# Patient Record
Sex: MALE | Race: WHITE | Employment: OTHER | URBAN - METROPOLITAN AREA
[De-identification: names, ages, dates, MRNs, and addresses within clinical notes are randomized per-mention and may not be internally consistent; named-entity substitution may affect disease eponyms.]

---

## 2023-07-07 ENCOUNTER — OFFICE VISIT (OUTPATIENT)
Dept: PRIMARY CARE | Facility: CLINIC | Age: 28
End: 2023-07-07
Payer: MEDICARE

## 2023-07-07 ENCOUNTER — LAB (OUTPATIENT)
Dept: LAB | Facility: LAB | Age: 28
End: 2023-07-07
Payer: COMMERCIAL

## 2023-07-07 VITALS
OXYGEN SATURATION: 98 % | SYSTOLIC BLOOD PRESSURE: 135 MMHG | HEIGHT: 75 IN | HEART RATE: 66 BPM | RESPIRATION RATE: 18 BRPM | BODY MASS INDEX: 22.01 KG/M2 | DIASTOLIC BLOOD PRESSURE: 90 MMHG | TEMPERATURE: 97.5 F | WEIGHT: 177 LBS

## 2023-07-07 DIAGNOSIS — Z13.220 LIPID SCREENING: ICD-10-CM

## 2023-07-07 DIAGNOSIS — K59.09 CHRONIC CONSTIPATION: ICD-10-CM

## 2023-07-07 DIAGNOSIS — E55.9 VITAMIN D DEFICIENCY: ICD-10-CM

## 2023-07-07 DIAGNOSIS — E53.8 VITAMIN B12 DEFICIENCY: ICD-10-CM

## 2023-07-07 DIAGNOSIS — Z00.00 ENCOUNTER FOR HEALTH MAINTENANCE EXAMINATION: Primary | ICD-10-CM

## 2023-07-07 DIAGNOSIS — Z00.00 ENCOUNTER FOR HEALTH MAINTENANCE EXAMINATION: ICD-10-CM

## 2023-07-07 LAB
ALANINE AMINOTRANSFERASE (SGPT) (U/L) IN SER/PLAS: 15 U/L (ref 10–52)
ALBUMIN (G/DL) IN SER/PLAS: 4.7 G/DL (ref 3.4–5)
ALKALINE PHOSPHATASE (U/L) IN SER/PLAS: 41 U/L (ref 33–120)
ANION GAP IN SER/PLAS: 9 MMOL/L (ref 10–20)
ASPARTATE AMINOTRANSFERASE (SGOT) (U/L) IN SER/PLAS: 20 U/L (ref 9–39)
BILIRUBIN TOTAL (MG/DL) IN SER/PLAS: 0.9 MG/DL (ref 0–1.2)
CALCIDIOL (25 OH VITAMIN D3) (NG/ML) IN SER/PLAS: 39 NG/ML
CALCIUM (MG/DL) IN SER/PLAS: 9.7 MG/DL (ref 8.6–10.3)
CARBON DIOXIDE, TOTAL (MMOL/L) IN SER/PLAS: 29 MMOL/L (ref 21–32)
CHLORIDE (MMOL/L) IN SER/PLAS: 104 MMOL/L (ref 98–107)
CHOLESTEROL (MG/DL) IN SER/PLAS: 142 MG/DL (ref 0–199)
CHOLESTEROL IN HDL (MG/DL) IN SER/PLAS: 57 MG/DL
CHOLESTEROL/HDL RATIO: 2.5
COBALAMIN (VITAMIN B12) (PG/ML) IN SER/PLAS: 349 PG/ML (ref 211–911)
CREATININE (MG/DL) IN SER/PLAS: 1.09 MG/DL (ref 0.5–1.3)
ERYTHROCYTE DISTRIBUTION WIDTH (RATIO) BY AUTOMATED COUNT: 13 % (ref 11.5–14.5)
ERYTHROCYTE MEAN CORPUSCULAR HEMOGLOBIN CONCENTRATION (G/DL) BY AUTOMATED: 33.1 G/DL (ref 32–36)
ERYTHROCYTE MEAN CORPUSCULAR VOLUME (FL) BY AUTOMATED COUNT: 91 FL (ref 80–100)
ERYTHROCYTES (10*6/UL) IN BLOOD BY AUTOMATED COUNT: 4.55 X10E12/L (ref 4.5–5.9)
GFR MALE: >90 ML/MIN/1.73M2
GLUCOSE (MG/DL) IN SER/PLAS: 79 MG/DL (ref 74–99)
HEMATOCRIT (%) IN BLOOD BY AUTOMATED COUNT: 41.4 % (ref 41–52)
HEMOGLOBIN (G/DL) IN BLOOD: 13.7 G/DL (ref 13.5–17.5)
LDL: 76 MG/DL (ref 0–99)
LEUKOCYTES (10*3/UL) IN BLOOD BY AUTOMATED COUNT: 4.2 X10E9/L (ref 4.4–11.3)
PLATELETS (10*3/UL) IN BLOOD AUTOMATED COUNT: 247 X10E9/L (ref 150–450)
POTASSIUM (MMOL/L) IN SER/PLAS: 4.2 MMOL/L (ref 3.5–5.3)
PROTEIN TOTAL: 7 G/DL (ref 6.4–8.2)
SODIUM (MMOL/L) IN SER/PLAS: 138 MMOL/L (ref 136–145)
THYROTROPIN (MIU/L) IN SER/PLAS BY DETECTION LIMIT <= 0.05 MIU/L: 2.57 MIU/L (ref 0.44–3.98)
TRIGLYCERIDE (MG/DL) IN SER/PLAS: 44 MG/DL (ref 0–149)
UREA NITROGEN (MG/DL) IN SER/PLAS: 16 MG/DL (ref 6–23)
VLDL: 9 MG/DL (ref 0–40)

## 2023-07-07 PROCEDURE — 80053 COMPREHEN METABOLIC PANEL: CPT

## 2023-07-07 PROCEDURE — 36415 COLL VENOUS BLD VENIPUNCTURE: CPT

## 2023-07-07 PROCEDURE — 82306 VITAMIN D 25 HYDROXY: CPT

## 2023-07-07 PROCEDURE — 85027 COMPLETE CBC AUTOMATED: CPT

## 2023-07-07 PROCEDURE — 84443 ASSAY THYROID STIM HORMONE: CPT

## 2023-07-07 PROCEDURE — 82607 VITAMIN B-12: CPT

## 2023-07-07 PROCEDURE — 80061 LIPID PANEL: CPT

## 2023-07-07 PROCEDURE — 99395 PREV VISIT EST AGE 18-39: CPT | Performed by: FAMILY MEDICINE

## 2023-07-07 RX ORDER — TADALAFIL 5 MG/1
5 TABLET ORAL DAILY
COMMUNITY
Start: 2023-04-08 | End: 2024-03-05

## 2023-07-07 NOTE — PROGRESS NOTES
"Subjective   Patient ID: Juan J Torres is a 28 y.o. male who presents for Annual Exam (Digestive health, feels like things have not been normal approx the last 6mos- stool wise. . ) and Lab Orders (fasting).    HPI   Patient comes in today for physical exam.  He would like to discuss his digestive health.  He is in a new house.  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  He states he was in his usual state of health until December 2022 when he started having problems with his bowels.  He now is having problems with constipation and his bowel movements are large and loose when they do come.  Sometimes he feels like he \"does not get it all out\".  He denies stomachaches.  He has been trying probiotics and cleaning up his diet and even tried fasting but nothing seems to help.  He is otherwise without complaints.  He is in good physical shape and works out.        10/1/2019  Patient comes in today complaining of severe headache behind his left eye on Sunday night after the [employer] game. He states he was fine all day long and after the game he went to work out. While he was lifting some weights he began to see spots in his left thigh and developed a severe pain just behind the left eye and the left temporal region. He stopped working out and did take some Advil and it slowly went away. Ever since then he has been extremely tired. He has not had a recurrence of the headache. He did have a hair transplant on 4/29/19 and the surgical line can be seen up in the frontal scalp near where the headache has been.    2/19/2019   The patient is a 23 year old male who presents today for an annual exam. His diet includes balanced healthy meals, dairy products, vegetables, meats, water and coffee, but NOT fast food, soda / pop, energy drinks or vitamins. He exercises competitively and 5 - 6 days a week. The patient is not sexually active. PHQ 2 DEPRESSION / GENERAL SCREENING: He does not report: often feeling down or depressed, " "having little interest in things, conflict or violence in the family, a mole that changed in size/shape, poor sleep, low energy or specific complaints. Note for \"Well Exam - Male Adult\": Patient comes in today complaining that he's had itching since last May.  He states the itching is all over his body and never produces a rash that is visible but he continues to itch.  He states he has been through a couple of dermatologists who recommended medications are cost over \"$800\" and thus he didn't get them filled.  The rash is on the back of his scalp as well.  Claritin does help.    Patient is also here today for a physical exam.      Review of Systems   All other systems reviewed and are negative.      Objective   /90   Pulse 66   Temp 36.4 °C (97.5 °F)   Resp 18   Ht 1.905 m (6' 3\")   Wt 80.3 kg (177 lb)   SpO2 98%   BMI 22.12 kg/m²     Physical Exam  Vitals reviewed.   Constitutional:       Appearance: He is well-developed.   HENT:      Head: Normocephalic and atraumatic.      Right Ear: Tympanic membrane, ear canal and external ear normal.      Left Ear: Tympanic membrane, ear canal and external ear normal.      Nose: Nose normal.      Mouth/Throat:      Mouth: Mucous membranes are moist.      Pharynx: Oropharynx is clear.   Eyes:      Extraocular Movements: Extraocular movements intact.      Conjunctiva/sclera: Conjunctivae normal.      Pupils: Pupils are equal, round, and reactive to light.   Cardiovascular:      Rate and Rhythm: Normal rate and regular rhythm.      Heart sounds: Normal heart sounds. No murmur heard.  Pulmonary:      Effort: Pulmonary effort is normal.      Breath sounds: Normal breath sounds. No wheezing.   Abdominal:      General: Abdomen is flat. Bowel sounds are normal.      Palpations: Abdomen is soft.   Musculoskeletal:         General: Normal range of motion.   Skin:     General: Skin is warm and dry.   Neurological:      General: No focal deficit present.      Mental Status: " He is alert and oriented to person, place, and time. Mental status is at baseline.   Psychiatric:         Mood and Affect: Mood normal.         Behavior: Behavior normal.         Thought Content: Thought content normal.         Judgment: Judgment normal.         Assessment/Plan   Problem List Items Addressed This Visit       Encounter for health maintenance examination - Primary    Relevant Orders    Comprehensive Metabolic Panel (Completed)    Vitamin D deficiency    Relevant Orders    Vitamin D, Total (Completed)    Vitamin B12 deficiency    Relevant Orders    CBC (Completed)    Vitamin B12 (Completed)    Chronic constipation    Relevant Orders    TSH with reflex to Free T4 if abnormal (Completed)   Will contact patient with lab results when available.  Patient to try Metamucil daily  Medication list reconciled.  Thank you for visiting today!      Professional services: Some of this note was completed using Dragon voice technology and sometimes the software misinterprets words. This may include unintended errors with respect to translation of words, typographical errors or grammar errors which may not have been identified prior to finalization of the chart note. Please take this into account when reading the note. Thank you.

## 2023-11-01 ENCOUNTER — ANCILLARY PROCEDURE (OUTPATIENT)
Dept: RADIOLOGY | Facility: CLINIC | Age: 28
End: 2023-11-01
Payer: COMMERCIAL

## 2023-11-01 ENCOUNTER — OFFICE VISIT (OUTPATIENT)
Dept: GASTROENTEROLOGY | Facility: CLINIC | Age: 28
End: 2023-11-01
Payer: COMMERCIAL

## 2023-11-01 ENCOUNTER — LAB (OUTPATIENT)
Dept: LAB | Facility: LAB | Age: 28
End: 2023-11-01
Payer: COMMERCIAL

## 2023-11-01 VITALS
WEIGHT: 180.6 LBS | DIASTOLIC BLOOD PRESSURE: 87 MMHG | SYSTOLIC BLOOD PRESSURE: 133 MMHG | HEART RATE: 76 BPM | HEIGHT: 75 IN | BODY MASS INDEX: 22.46 KG/M2

## 2023-11-01 DIAGNOSIS — R19.4 CHANGE IN BOWEL HABITS: Primary | ICD-10-CM

## 2023-11-01 DIAGNOSIS — R19.4 CHANGE IN BOWEL HABITS: ICD-10-CM

## 2023-11-01 DIAGNOSIS — R19.7 DIARRHEA, UNSPECIFIED TYPE: ICD-10-CM

## 2023-11-01 DIAGNOSIS — R14.0 ABDOMINAL BLOATING: ICD-10-CM

## 2023-11-01 LAB
ALBUMIN SERPL BCP-MCNC: 4.9 G/DL (ref 3.4–5)
ALP SERPL-CCNC: 46 U/L (ref 33–120)
ALT SERPL W P-5'-P-CCNC: 17 U/L (ref 10–52)
ANION GAP SERPL CALC-SCNC: 12 MMOL/L (ref 10–20)
AST SERPL W P-5'-P-CCNC: 19 U/L (ref 9–39)
BASOPHILS # BLD AUTO: 0.05 X10*3/UL (ref 0–0.1)
BASOPHILS NFR BLD AUTO: 0.7 %
BILIRUB SERPL-MCNC: 0.6 MG/DL (ref 0–1.2)
BUN SERPL-MCNC: 21 MG/DL (ref 6–23)
CALCIUM SERPL-MCNC: 9.5 MG/DL (ref 8.6–10.3)
CHLORIDE SERPL-SCNC: 102 MMOL/L (ref 98–107)
CO2 SERPL-SCNC: 29 MMOL/L (ref 21–32)
CREAT SERPL-MCNC: 1.06 MG/DL (ref 0.5–1.3)
CRP SERPL-MCNC: <0.1 MG/DL
EOSINOPHIL # BLD AUTO: 0.2 X10*3/UL (ref 0–0.7)
EOSINOPHIL NFR BLD AUTO: 2.9 %
ERYTHROCYTE [DISTWIDTH] IN BLOOD BY AUTOMATED COUNT: 12.8 % (ref 11.5–14.5)
ERYTHROCYTE [SEDIMENTATION RATE] IN BLOOD BY WESTERGREN METHOD: <1 MM/H (ref 0–15)
FOLATE SERPL-MCNC: 7.3 NG/ML
GFR SERPL CREATININE-BSD FRML MDRD: >90 ML/MIN/1.73M*2
GLUCOSE SERPL-MCNC: 77 MG/DL (ref 74–99)
HCT VFR BLD AUTO: 44.7 % (ref 41–52)
HGB BLD-MCNC: 14.9 G/DL (ref 13.5–17.5)
IMM GRANULOCYTES # BLD AUTO: 0.02 X10*3/UL (ref 0–0.7)
IMM GRANULOCYTES NFR BLD AUTO: 0.3 % (ref 0–0.9)
LYMPHOCYTES # BLD AUTO: 2.47 X10*3/UL (ref 1.2–4.8)
LYMPHOCYTES NFR BLD AUTO: 36.1 %
MCH RBC QN AUTO: 30.5 PG (ref 26–34)
MCHC RBC AUTO-ENTMCNC: 33.3 G/DL (ref 32–36)
MCV RBC AUTO: 92 FL (ref 80–100)
MONOCYTES # BLD AUTO: 0.55 X10*3/UL (ref 0.1–1)
MONOCYTES NFR BLD AUTO: 8 %
NEUTROPHILS # BLD AUTO: 3.56 X10*3/UL (ref 1.2–7.7)
NEUTROPHILS NFR BLD AUTO: 52 %
NRBC BLD-RTO: 0 /100 WBCS (ref 0–0)
PLATELET # BLD AUTO: 253 X10*3/UL (ref 150–450)
POTASSIUM SERPL-SCNC: 4.1 MMOL/L (ref 3.5–5.3)
PROT SERPL-MCNC: 7.4 G/DL (ref 6.4–8.2)
RBC # BLD AUTO: 4.88 X10*6/UL (ref 4.5–5.9)
SODIUM SERPL-SCNC: 139 MMOL/L (ref 136–145)
TSH SERPL-ACNC: 2.43 MIU/L (ref 0.44–3.98)
WBC # BLD AUTO: 6.9 X10*3/UL (ref 4.4–11.3)

## 2023-11-01 PROCEDURE — 82746 ASSAY OF FOLIC ACID SERUM: CPT

## 2023-11-01 PROCEDURE — 74018 RADEX ABDOMEN 1 VIEW: CPT | Mod: FY

## 2023-11-01 PROCEDURE — 36415 COLL VENOUS BLD VENIPUNCTURE: CPT

## 2023-11-01 PROCEDURE — 99203 OFFICE O/P NEW LOW 30 MIN: CPT | Performed by: REGISTERED NURSE

## 2023-11-01 PROCEDURE — 86140 C-REACTIVE PROTEIN: CPT

## 2023-11-01 PROCEDURE — 85652 RBC SED RATE AUTOMATED: CPT

## 2023-11-01 PROCEDURE — 74018 RADEX ABDOMEN 1 VIEW: CPT | Performed by: RADIOLOGY

## 2023-11-01 PROCEDURE — 85025 COMPLETE CBC W/AUTO DIFF WBC: CPT

## 2023-11-01 PROCEDURE — 84443 ASSAY THYROID STIM HORMONE: CPT

## 2023-11-01 PROCEDURE — 80053 COMPREHEN METABOLIC PANEL: CPT

## 2023-11-01 PROCEDURE — 1036F TOBACCO NON-USER: CPT | Performed by: REGISTERED NURSE

## 2023-11-01 PROCEDURE — 83516 IMMUNOASSAY NONANTIBODY: CPT

## 2023-11-01 RX ORDER — SODIUM, POTASSIUM,MAG SULFATES 17.5-3.13G
1 SOLUTION, RECONSTITUTED, ORAL ORAL EVERY 12 HOURS
Qty: 2 EACH | Refills: 0 | Status: SHIPPED | OUTPATIENT
Start: 2023-11-01

## 2023-11-01 ASSESSMENT — ENCOUNTER SYMPTOMS
SHORTNESS OF BREATH: 0
EYES NEGATIVE: 1
MYALGIAS: 0
ARTHRALGIAS: 0
UNEXPECTED WEIGHT CHANGE: 0
JOINT SWELLING: 0
NERVOUS/ANXIOUS: 0
ENDOCRINE NEGATIVE: 1
DIFFICULTY URINATING: 0
APPETITE CHANGE: 0
COUGH: 0
EYE PAIN: 0

## 2023-11-01 NOTE — PATIENT INSTRUCTIONS
ASSESSMENT    Change in bowels     PLAN    Check labs and stool tests   Do abdominal x-ray   Schedule Colonoscopy.  My office will send in a prescription for a bowel prep for you to your pharmacy.  Please follow the prep instructions closely.  You may have clear liquids only the day before the procedure. You cannot have anything red or purple. You may have popsicles, hard candy or gum.  You will need a .  You will receive sedation for the procedure by an an anesthesia professional to keep you comfortable during the procedure.     Constipation  - Begin laxative - Miralax (Polyethylene Glycol) 17g (one capful) mixed in 6-8 oz of water and drink each evening after dinner. This may take 2-4 days to work.  If you are not moving your bowels well after 4 days, then increase the dose to 17g mixed in 6-8 oz of water, twice a day, 12 hours apart. Lower the dose as needed.   Follow up after the procedure

## 2023-11-01 NOTE — PROGRESS NOTES
REASON FOR VISIT:  Patient states he is having alternating constipation and diarrhea. No GERD symptoms.  Concerned that he may have IBD or IBS.    HPI:  Juan J Torres is a 28 y.o. male who presents for change in bowels x one year. Alternating constipation and diarrhea since 12/22.  Endorses blood on the toilet tissue.  Denies any change in medications or diet.  He endorses increased stress at work for the past year.  States that he has a history of hypothyroidism but has not taken medications.  He is trying to treat his thyroid issue with a holistic approach.    Normal pattern - 1-2x a day.  States that his stool is usually thin pencils.  Sometimes strains to have a bowel movement or skips a day and then has watery diarrhea..  His weight and appetite are stable.      He denies reflux, dysphagia, odynophagia, epigastric pain, nausea or vomiting.  He endorses lower abdominal pain and bloating.    Denies NSAID use    Med list notable for -  No medications     Labs notable for - normal labs in July     No fhx of CRC- no fam h/o colon CA in 1st degree relative, no family history of IBD or celiac disease    Prev endoscopic eval:     >> Colonoscopy - never  >> EGD - never     REVIEW OF SYSTEMS    Review of Systems   Constitutional:  Negative for appetite change and unexpected weight change.   HENT:  Negative for mouth sores.    Eyes: Negative.  Negative for pain and visual disturbance.   Respiratory:  Negative for cough and shortness of breath.    Cardiovascular:  Negative for chest pain.   Endocrine: Negative.    Genitourinary:  Negative for difficulty urinating.   Musculoskeletal:  Negative for arthralgias, joint swelling and myalgias.   Skin:  Negative for rash.   Allergic/Immunologic: Negative for environmental allergies and food allergies.   Psychiatric/Behavioral:  The patient is not nervous/anxious.       No Known Allergies    No past medical history on file. - thyroid disease ?     No past surgical history on  file. - denies abd surgery     No family history on file.    Social History     Tobacco Use    Smoking status: Never     Passive exposure: Never    Smokeless tobacco: Never   Substance Use Topics    Alcohol use: Yes       Current Outpatient Medications   Medication Sig Dispense Refill    tadalafil (Cialis) 5 mg tablet Take 1 tablet (5 mg) by mouth once daily.       No current facility-administered medications for this visit.       PHYSICAL EXAM:  There were no vitals taken for this visit.     Physical Exam  Constitutional:       Appearance: Normal appearance.   HENT:      Head: Normocephalic and atraumatic.      Nose: Nose normal.   Eyes:      Pupils: Pupils are equal, round, and reactive to light.   Cardiovascular:      Rate and Rhythm: Normal rate and regular rhythm.   Pulmonary:      Effort: Pulmonary effort is normal.      Breath sounds: Normal breath sounds.   Abdominal:      General: Abdomen is flat. Bowel sounds are normal. There is no distension.      Palpations: Abdomen is soft. There is no mass.      Tenderness: There is no abdominal tenderness. There is no guarding or rebound.      Hernia: No hernia is present.   Genitourinary:     Rectum: Normal.   Musculoskeletal:         General: Normal range of motion.      Cervical back: Normal range of motion and neck supple.   Skin:     General: Skin is warm and dry.   Neurological:      Mental Status: He is alert and oriented to person, place, and time.   Psychiatric:         Mood and Affect: Mood normal.         Behavior: Behavior normal.          ASSESSMENT    Change in bowels for one year   Suspect IBS but will proceed with colonoscopy to rule out inflammatory bowel disease    PLAN    Check labs and stool tests to rule out infection, malabsorption or inflammation  Do abdominal x-ray today  Schedule Colonoscopy.  My office will send in a prescription for a bowel prep for you to your pharmacy.  Please follow the prep instructions closely.  You may have clear  liquids only the day before the procedure. You cannot have anything red or purple. You may have popsicles, hard candy or gum.  You will need a .  You will receive sedation for the procedure by an an anesthesia professional to keep you comfortable during the procedure.     Constipation  - Begin laxative - Miralax (Polyethylene Glycol) 17g (one capful) mixed in 6-8 oz of water and drink each evening after dinner. This may take 2-4 days to work.  If you are not moving your bowels well after 4 days, then increase the dose to 17g mixed in 6-8 oz of water, twice a day, 12 hours apart. Lower the dose as needed.   You can also continue Metamucil daily I would recommend that you use 1 teaspoon of Metamucil twice a day mixed in 6 to 8 ounces of water to see if that may improve your bowel habits  Follow up after the procedure .  Can do a virtual telemedicine visit if you prefer              Date: 11/1/2023  Time: 2:44 PM

## 2023-11-02 LAB — TTG IGA SER IA-ACNC: <1 U/ML

## 2023-11-28 ENCOUNTER — APPOINTMENT (OUTPATIENT)
Dept: PRIMARY CARE | Facility: CLINIC | Age: 28
End: 2023-11-28

## 2023-12-04 ENCOUNTER — APPOINTMENT (OUTPATIENT)
Dept: PRIMARY CARE | Facility: CLINIC | Age: 28
End: 2023-12-04

## 2023-12-05 ENCOUNTER — LAB (OUTPATIENT)
Dept: LAB | Facility: LAB | Age: 28
End: 2023-12-05
Payer: COMMERCIAL

## 2023-12-05 DIAGNOSIS — R19.7 DIARRHEA, UNSPECIFIED TYPE: ICD-10-CM

## 2023-12-05 PROCEDURE — 83993 ASSAY FOR CALPROTECTIN FECAL: CPT

## 2023-12-05 PROCEDURE — 87506 IADNA-DNA/RNA PROBE TQ 6-11: CPT

## 2023-12-05 PROCEDURE — 89125 SPECIMEN FAT STAIN: CPT

## 2023-12-05 PROCEDURE — 82653 EL-1 FECAL QUANTITATIVE: CPT

## 2023-12-06 LAB

## 2023-12-07 ENCOUNTER — HOSPITAL ENCOUNTER (OUTPATIENT)
Dept: GASTROENTEROLOGY | Facility: EXTERNAL LOCATION | Age: 28
Discharge: HOME | End: 2023-12-07
Payer: COMMERCIAL

## 2023-12-07 VITALS
SYSTOLIC BLOOD PRESSURE: 131 MMHG | HEART RATE: 74 BPM | HEIGHT: 75 IN | WEIGHT: 175 LBS | RESPIRATION RATE: 16 BRPM | OXYGEN SATURATION: 98 % | DIASTOLIC BLOOD PRESSURE: 82 MMHG | BODY MASS INDEX: 21.76 KG/M2 | TEMPERATURE: 97.5 F

## 2023-12-07 DIAGNOSIS — R14.0 ABDOMINAL BLOATING: ICD-10-CM

## 2023-12-07 DIAGNOSIS — R19.4 CHANGE IN BOWEL HABITS: ICD-10-CM

## 2023-12-07 PROCEDURE — 88305 TISSUE EXAM BY PATHOLOGIST: CPT | Performed by: PATHOLOGY

## 2023-12-07 PROCEDURE — 45380 COLONOSCOPY AND BIOPSY: CPT | Performed by: STUDENT IN AN ORGANIZED HEALTH CARE EDUCATION/TRAINING PROGRAM

## 2023-12-07 PROCEDURE — 88305 TISSUE EXAM BY PATHOLOGIST: CPT

## 2023-12-07 PROCEDURE — 0753T DGTZ GLS MCRSCP SLD LEVEL IV: CPT

## 2023-12-07 RX ORDER — SODIUM CHLORIDE 9 MG/ML
20 INJECTION, SOLUTION INTRAVENOUS CONTINUOUS
Status: DISCONTINUED | OUTPATIENT
Start: 2023-12-07 | End: 2023-12-08 | Stop reason: HOSPADM

## 2023-12-07 ASSESSMENT — PAIN SCALES - GENERAL
PAINLEVEL_OUTOF10: 0 - NO PAIN

## 2023-12-07 ASSESSMENT — PAIN - FUNCTIONAL ASSESSMENT
PAIN_FUNCTIONAL_ASSESSMENT: 0-10

## 2023-12-07 ASSESSMENT — COLUMBIA-SUICIDE SEVERITY RATING SCALE - C-SSRS
1. IN THE PAST MONTH, HAVE YOU WISHED YOU WERE DEAD OR WISHED YOU COULD GO TO SLEEP AND NOT WAKE UP?: NO
6. HAVE YOU EVER DONE ANYTHING, STARTED TO DO ANYTHING, OR PREPARED TO DO ANYTHING TO END YOUR LIFE?: NO
2. HAVE YOU ACTUALLY HAD ANY THOUGHTS OF KILLING YOURSELF?: NO

## 2023-12-07 NOTE — SIGNIFICANT EVENT
Discharge Instructions reviewed with patient. Verbalizes an understanding of post procedure and anesthesia instructions. Verbalizes an understanding of signs/symptoms to watch for at home.      No difficulties

## 2023-12-07 NOTE — DISCHARGE INSTRUCTIONS
Patient Instructions Post Procedure      The anesthetics, sedatives or narcotics which were given to you today will be acting in your body for the next 24 hours, so you might feel a little sleepy or groggy.  This feeling should slowly wear off. Carefully read and follow the instructions.     You received sedation today:  - Do not drive or operate any machinery or power tools of any kind.   - No alcoholic beverages today, not even beer or wine.  - Do not make any important decisions or sign any legal documents.  - No over the counter medications that contain alcohol or that may cause drowsiness.    While it is common to experience mild to moderate abdominal distention, gas, or belching after your procedure, if any of these symptoms occur following discharge from the GI Lab or within one week of having your procedure, call the [facility] to be advised whether a visit to your nearest Urgent Care or Emergency Department is indicated.  Take this paper with you if you go.   - If you develop an allergic reaction to the medications that were given during your procedure such as difficulty breathing, rash, hives, severe nausea, vomiting or lightheadedness.  - If you experience chest pain, shortness of breath, severe abdominal pain, fevers and chills.  -If you develop signs and symptoms of bleeding such as blood in your spit, if your stools turn black, tarry, or bloody  - If you have not urinated within 8 hours following your procedure.  - If your IV site becomes painful, red, inflamed, or looks infected.    If you received a biopsy/polypectomy/sphincterotomy the following instructions apply below:  __ Do not use Aspirin containing products, non-steroidal medications or anti-coagulants for one week following your procedure. (Examples of these types of medications are: Advil, Arthrotec, Aleve, Coumadin, Ecotrin, Heparin, Ibuprofen, Indocin, Motrin, Naprosyn, Nuprin, Plavix, Vioxx, and Voltarin, or their generic  forms.  This list is not all-inclusive.  Check with your physician or pharmacist before resuming medications.)   __ Eat a soft diet today.  Avoid foods that are poorly digested for the next 24 hours.  These foods would include: nuts, beans, lettuce, red meats, and fried foods. Start with liquids and advance your diet as tolerated, gradually work up to eating solids.   __ Do not have a Barium Study or Enema for one week.    Your physician recommends the additional following instructions:    -You have a contact number available for emergencies. The signs and symptoms of potential delayed complications were discussed with you. You may return to normal activities tomorrow.  -Resume your previous diet or other if specified.  -Continue your present medications.   -We are waiting for your pathology results, if applicable.  -The findings and recommendations have been discussed with you and/or family.  - Please see Medication Reconciliation Form for new medication/medications prescribed.     If you experience any problems or have any questions following discharge from the GI Lab, please call: [phone] from 7 am- 4:30 pm.  In the event of an emergency please go to the closest Emergency Department or call Dr. Wayne [phone]

## 2023-12-07 NOTE — H&P
Outpatient Hospital Procedure H&P    Patient Profile  Name Juan J Torres  Date of Birth 1995  MRN [id number]  PCP Adarsh Zee        Diagnosis: Altered bowel habits.   Procedure(s):  Colonoscopy.    Allergies  No Known Allergies    Past Medical History   Past Medical History:   Diagnosis Date    Change in bowel habit        Medication Reviewed - yes  Prior to Admission medications    Medication Sig Start Date End Date Taking? Authorizing Provider   sodium,potassium,mag sulfates (Suprep Bowel Prep Kit) 17.5-3.13-1.6 gram recon soln solution Take 1 bottle by mouth every 12 hours. 11/1/23  Yes Colleen Mittal, APRN-CNP   tadalafil (Cialis) 5 mg tablet Take 1 tablet (5 mg) by mouth once daily. 4/8/23   Historical Provider, MD       Physical Exam  Vitals:    12/07/23 1209   Pulse: 77   Resp: 10   Temp: 36.3 °C (97.3 °F)   SpO2: 100%      Weight   Vitals:    12/07/23 1209   Weight: 79.4 kg (175 lb)     BMI Body mass index is 21.87 kg/m².    General: A&Ox3, NAD.  HEENT: AT/NC.   CV: RRR. No murmur.  Resp: CTA bilaterally. No wheezing, rhonchi or rales.   GI: Soft, NT/ND. BSx4.  Extrem: No edema. Pulses intact.  Skin: No Jaundice.   Neuro: No focal deficits.   Psych: Normal mood and affect.        Oropharyngeal Classification I (soft palate, uvula, fauces, and tonsillar pillars visible)  ASA PS Classification 1.  Sedation Plan: Deep Sedation.  Procedure Plan - pre-procedural (re)assesment completed by physician:  discharge/transfer patient when discharge criteria met    Richard Wayne DO  12/7/2023 12:20 PM

## 2023-12-07 NOTE — Clinical Note
Patient tolerated the procedure well and is comfortable with no complaints of pain. Abdomen soft non distended. Vital signs stable. Arousable prior to transport. Patient transported to PACU via stretcher. Handoff completed.

## 2023-12-08 LAB
FAT STL QL: NORMAL
NEUTRAL FAT STL QL: NORMAL

## 2023-12-09 LAB
CALPROTECTIN STL-MCNT: 16 UG/G
ELASTASE PANC STL-MCNT: 715 UG/G

## 2023-12-11 ENCOUNTER — TELEPHONE (OUTPATIENT)
Dept: GASTROENTEROLOGY | Facility: CLINIC | Age: 28
End: 2023-12-11

## 2024-01-04 LAB
LABORATORY COMMENT REPORT: NORMAL
PATH REPORT.FINAL DX SPEC: NORMAL
PATH REPORT.GROSS SPEC: NORMAL
PATH REPORT.TOTAL CANCER: NORMAL

## 2024-01-08 ENCOUNTER — TELEPHONE (OUTPATIENT)
Dept: GASTROENTEROLOGY | Facility: CLINIC | Age: 29
End: 2024-01-08

## 2024-03-05 ENCOUNTER — OFFICE VISIT (OUTPATIENT)
Dept: UROLOGY | Facility: CLINIC | Age: 29
End: 2024-03-05
Payer: COMMERCIAL

## 2024-03-05 VITALS — TEMPERATURE: 97 F | BODY MASS INDEX: 21.76 KG/M2 | HEIGHT: 75 IN | WEIGHT: 175 LBS

## 2024-03-05 DIAGNOSIS — N52.8 OTHER MALE ERECTILE DYSFUNCTION: ICD-10-CM

## 2024-03-05 DIAGNOSIS — N52.9 ERECTILE DYSFUNCTION, UNSPECIFIED ERECTILE DYSFUNCTION TYPE: ICD-10-CM

## 2024-03-05 DIAGNOSIS — I86.1 VARICOCELE: ICD-10-CM

## 2024-03-05 DIAGNOSIS — Z09 ENCOUNTER FOR FOLLOW-UP: Primary | ICD-10-CM

## 2024-03-05 DIAGNOSIS — R39.9 LOWER URINARY TRACT SYMPTOMS (LUTS): ICD-10-CM

## 2024-03-05 PROCEDURE — 99213 OFFICE O/P EST LOW 20 MIN: CPT | Performed by: UROLOGY

## 2024-03-05 PROCEDURE — 1036F TOBACCO NON-USER: CPT | Performed by: UROLOGY

## 2024-03-05 RX ORDER — TADALAFIL 5 MG/1
5 TABLET ORAL DAILY
Qty: 30 TABLET | Refills: 11 | Status: SHIPPED | OUTPATIENT
Start: 2024-03-05 | End: 2024-04-04

## 2024-03-05 ASSESSMENT — PAIN SCALES - GENERAL: PAINLEVEL: 0-NO PAIN

## 2024-03-05 NOTE — PROGRESS NOTES
"HPI  28 y.o. male presenting for Varicocele, ED.      Last visit 2/7/23:  -Discussed conservative measures like nsaids, elevation, ice, tight underwear etc.  -Discussed warning signs  -Reviewed varicocele repair in detail  -ED labs (patient may complete this elsewhere and will bring results)  -trial of Cialis 5 mg daily.  -referral to sex psychologist     Todays Visit:  #Erectile dysfunction  -daily Cialis 5mg  -uses very rarely, does help  -erections have overall improved     -ED more with intercourse than masturbation  -believes there are psych factors  -libido is weaker and variable  -non smoker  -no ntg, no hx of heart attack     #Varicocele  -continued pain on both sides, more so on left  -has become very bothersome for him  -has noticed that testicles are \"twisted\" when he wakes up  -did not do pelvic floor PT  -pain worsens with activity  -denies f/c, n/v, unexpected weight loss   -some tenderness to palpation     -Dr. Johnston at [facility] diagnosed- , wanted to do bilateral varicocele repair   -dull scrotal pain when moving   -had a UTI in past, never STIs  -pain intermittent  -no new masses or lumps     Scrotal US at [facility]: bilateral varicoceles  Scrotal US 1/21/21: bilateral varicoceles      [facility]  [employer] school to be special     Current Medications:  Current Outpatient Medications   Medication Sig Dispense Refill    sodium,potassium,mag sulfates (Suprep Bowel Prep Kit) 17.5-3.13-1.6 gram recon soln solution Take 1 bottle by mouth every 12 hours. 2 each 0    tadalafil (Cialis) 5 mg tablet Take 1 tablet (5 mg) by mouth once daily.       No current facility-administered medications for this visit.        PMH:  Past Medical History:   Diagnosis Date    Change in bowel habit        PSH:  Past Surgical History:   Procedure Laterality Date    TONSILLECTOMY      WISDOM TOOTH EXTRACTION         FMH:  No family history on file.    SHx:  Social History     Tobacco Use    Smoking status: Never    "  Passive exposure: Never    Smokeless tobacco: Never   Substance Use Topics    Alcohol use: Yes     Comment: moderate    Drug use: Yes     Types: Marijuana     Comment: occasional       Allergies:  No Known Allergies    Physical Exam   Testicles descended bilaterally, nontender  Grade 2 varicoceles bilaterally   Vasa palpable bilaterally  Penis circ'd, no lesions, no plaques    Assessment/Plan  #Scrotal pain, varicocele bilateral  -discussed the incidence and natural history of varicocele, as well as potential effects on fertility, pain, etc.   -discussed that given lack of major symptoms, this varicocele is unlikely to cause him major issues  -Discussed cord block and microsurgical cord denervation in detail, including risks benefits and alternatives  -pelvic floor PT for now      #Erectile Dysfunction - I discussed the etiology and different treatment modalities for erectile dysfunction. Specifically, we talked about lifestyle factors like smoking, diet, and exercise. We also discussed ED as a sign of systemic disease, and the contributions of elevated cholesterol and elevated blood sugars on erections. We then discussed treatment modalities.  -Cialis 5 mg daily, refilled today    Follow up in 3 months    Scribe Attestation  By signing my name below, I, Mi Olivera-Nohemi Bull, attest that this documentation  has been prepared under the direction and in the presence of Palak Jordan MD.

## 2024-06-11 ENCOUNTER — APPOINTMENT (OUTPATIENT)
Dept: UROLOGY | Facility: CLINIC | Age: 29
End: 2024-06-11
Payer: COMMERCIAL

## 2024-11-22 ENCOUNTER — OFFICE VISIT (OUTPATIENT)
Dept: PRIMARY CARE | Facility: CLINIC | Age: 29
End: 2024-11-22
Payer: COMMERCIAL

## 2024-11-22 VITALS
HEART RATE: 65 BPM | WEIGHT: 175 LBS | BODY MASS INDEX: 21.76 KG/M2 | RESPIRATION RATE: 18 BRPM | TEMPERATURE: 97.9 F | DIASTOLIC BLOOD PRESSURE: 80 MMHG | SYSTOLIC BLOOD PRESSURE: 120 MMHG | HEIGHT: 75 IN | OXYGEN SATURATION: 97 %

## 2024-11-22 DIAGNOSIS — K58.2 IRRITABLE BOWEL SYNDROME WITH BOTH CONSTIPATION AND DIARRHEA: ICD-10-CM

## 2024-11-22 DIAGNOSIS — R19.8 ALTERNATING CONSTIPATION AND DIARRHEA: Primary | ICD-10-CM

## 2024-11-22 PROCEDURE — 99213 OFFICE O/P EST LOW 20 MIN: CPT | Performed by: FAMILY MEDICINE

## 2024-11-22 NOTE — PROGRESS NOTES
"Subjective   Patient ID: Juan J Torres is a 29 y.o. male who presents for GI Problem (Patient has been having a recurring issue with constipation, diarrhea and gas. Had a colonoscopy completed in December of 2023 that was normal. ).    HPI  Patient comes in today complaining of recurrent constipation, diarrhea and gas and bloating.  He had a normal colonoscopy in December 2023.  He states he was not having any of these issues at that time.  He states since then more often than not he is having to go to the bathroom within an hour after eating.  He states that he has trouble feeling like he empties completely after going to the bathroom.  Laxatives will sometimes help for a week and then symptoms will come back.  He has been looking online at his symptoms and his very anxious and concerned that something more ominous may be happening.  I reassured him with a negative colonoscopy in December of last year that is not likely to be happening.    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    Review of Systems   All other systems reviewed and are negative.      Objective   Vitals:  /80   Pulse 65   Temp 36.6 °C (97.9 °F)   Resp 18   Ht 1.905 m (6' 3\")   Wt 79.4 kg (175 lb)   SpO2 97%   BMI 21.87 kg/m²     Physical Exam  Vitals reviewed.   Constitutional:       Appearance: He is well-developed.   HENT:      Head: Normocephalic and atraumatic.      Right Ear: Tympanic membrane, ear canal and external ear normal.      Left Ear: Tympanic membrane, ear canal and external ear normal.      Nose: Nose normal.      Mouth/Throat:      Mouth: Mucous membranes are moist.      Pharynx: Oropharynx is clear.   Eyes:      Extraocular Movements: Extraocular movements intact.      Conjunctiva/sclera: Conjunctivae normal.      Pupils: Pupils are equal, round, and reactive to light.   Cardiovascular:      Rate and Rhythm: Normal rate and regular rhythm.      Heart sounds: Normal heart sounds. No murmur " heard.  Pulmonary:      Effort: Pulmonary effort is normal.      Breath sounds: Normal breath sounds. No wheezing.   Abdominal:      General: Abdomen is flat. Bowel sounds are normal.      Palpations: Abdomen is soft.   Musculoskeletal:         General: Normal range of motion.   Skin:     General: Skin is warm and dry.   Neurological:      General: No focal deficit present.      Mental Status: He is alert and oriented to person, place, and time. Mental status is at baseline.   Psychiatric:         Mood and Affect: Mood normal.         Behavior: Behavior normal.         Thought Content: Thought content normal.         Judgment: Judgment normal.         Assessment/Plan   Problem List Items Addressed This Visit    None  Visit Diagnoses       Alternating constipation and diarrhea    -  Primary    Irritable bowel syndrome with both constipation and diarrhea            I recommended patient use Metamucil either 3 capsules or 3 Gummies daily and contact me in 2 weeks with an update.  Medication list reconciled.  Thank you for visiting today!      Professional services: Some of this note was completed using Dragon voice technology and sometimes the software misinterprets words. This may include unintended errors with respect to translation of words, typographical errors or grammar errors which may not have been identified prior to finalization of the chart note. Please take this into account when reading the note. Thank you.       Wil Judd,  11/22/24 4:40 PM

## 2025-01-13 ENCOUNTER — APPOINTMENT (OUTPATIENT)
Dept: GASTROENTEROLOGY | Facility: CLINIC | Age: 30
End: 2025-01-13
Payer: COMMERCIAL

## 2025-02-03 ENCOUNTER — APPOINTMENT (OUTPATIENT)
Dept: CARDIOLOGY | Facility: CLINIC | Age: 30
End: 2025-02-03
Payer: COMMERCIAL

## 2025-02-03 VITALS
BODY MASS INDEX: 22.11 KG/M2 | HEART RATE: 71 BPM | DIASTOLIC BLOOD PRESSURE: 60 MMHG | HEIGHT: 75 IN | SYSTOLIC BLOOD PRESSURE: 118 MMHG | WEIGHT: 177.8 LBS

## 2025-02-03 DIAGNOSIS — R42 LIGHTHEADEDNESS: ICD-10-CM

## 2025-02-03 DIAGNOSIS — R06.02 SHORTNESS OF BREATH: ICD-10-CM

## 2025-02-03 DIAGNOSIS — R00.2 PALPITATIONS: Primary | ICD-10-CM

## 2025-02-03 PROCEDURE — 3008F BODY MASS INDEX DOCD: CPT | Performed by: NURSE PRACTITIONER

## 2025-02-03 PROCEDURE — 93000 ELECTROCARDIOGRAM COMPLETE: CPT | Performed by: NURSE PRACTITIONER

## 2025-02-03 PROCEDURE — 99205 OFFICE O/P NEW HI 60 MIN: CPT | Performed by: NURSE PRACTITIONER

## 2025-02-03 PROCEDURE — 1036F TOBACCO NON-USER: CPT | Performed by: NURSE PRACTITIONER

## 2025-02-03 NOTE — PROGRESS NOTES
Juan J Torres is a 29 y.o. male that presents to the office today with his fiance for new patient cardiac evaluation for palpitations. He has a PMH of constipation, Vit D deficiency, Vit B12 deficiency. He denies tobacco use or  vaping. Admits to social alcohol use, rare cannabis use. Coffee on a daily basis. Is very active.  Consumes healthy diet avoiding processed foods. Denies previous cardiac evaluation. Family history positive for father HTN, mother HTN.      He states that he has has palpitations on and off for years but within the last week his palpitations have increased in frequency and duration to where he feels that they do not stop.  He also reports that he is now experiencing shortness of breath, lightheadedness and occasional chest discomfort with the palpitations. He also states that he feels as though the palpitations are making his eyes twitch at times. He states that he has stop drinking caffeine and alcohol with the worsening palpitations.       Testing Reviewed  EKG obtained in the office today and verified with Dr. Byrd  shows Sinus rhythm with PAC's with aberrant conduction. LBBB HR 71  bpm, QT/Qtc 434/471    CBC:   Lab Results   Component Value Date    WBC 6.9 11/01/2023    RBC 4.88 11/01/2023    HGB 14.9 11/01/2023    HCT 44.7 11/01/2023     11/01/2023        CMP:    Lab Results   Component Value Date     11/01/2023    K 4.1 11/01/2023     11/01/2023    CO2 29 11/01/2023    BUN 21 11/01/2023    CREATININE 1.06 11/01/2023    GLUCOSE 77 11/01/2023    CALCIUM 9.5 11/01/2023       Lipid Profile:    Lab Results   Component Value Date    TRIG 44 07/07/2023    HDL 57.0 07/07/2023       BMP:  Lab Results   Component Value Date     11/01/2023     07/07/2023     10/01/2019     02/15/2019    K 4.1 11/01/2023    K 4.2 07/07/2023    K 4.1 10/01/2019    K 4.2 02/15/2019     11/01/2023     07/07/2023     10/01/2019     02/15/2019     CO2 29 11/01/2023    CO2 29 07/07/2023    CO2 30 10/01/2019    CO2 27 02/15/2019    BUN 21 11/01/2023    BUN 16 07/07/2023    BUN 19 10/01/2019    BUN 17 02/15/2019    CREATININE 1.06 11/01/2023    CREATININE 1.09 07/07/2023    CREATININE 1.05 10/01/2019    CREATININE 1.10 02/15/2019       CBC:  Lab Results   Component Value Date    WBC 6.9 11/01/2023    WBC 4.2 (L) 07/07/2023    WBC 6.0 10/01/2019    WBC 4.3 (L) 02/15/2019    RBC 4.88 11/01/2023    RBC 4.55 07/07/2023    RBC 4.95 10/01/2019    RBC 4.89 02/15/2019    HGB 14.9 11/01/2023    HGB 13.7 07/07/2023    HGB 15.1 10/01/2019    HGB 14.8 02/15/2019    HCT 44.7 11/01/2023    HCT 41.4 07/07/2023    HCT 45.9 10/01/2019    HCT 45.5 02/15/2019    MCV 92 11/01/2023    MCV 91 07/07/2023    MCV 93 10/01/2019    MCV 93 02/15/2019    MCH 30.5 11/01/2023    MCHC 33.3 11/01/2023    MCHC 33.1 07/07/2023    MCHC 32.9 10/01/2019    MCHC 32.5 02/15/2019    RDW 12.8 11/01/2023    RDW 13.0 07/07/2023    RDW 13.1 10/01/2019    RDW 13.2 02/15/2019     11/01/2023     07/07/2023     10/01/2019     02/15/2019       Hepatic Function Panel:    Lab Results   Component Value Date    ALKPHOS 46 11/01/2023    ALT 17 11/01/2023    AST 19 11/01/2023    PROT 7.4 11/01/2023    BILITOT 0.6 11/01/2023     TSH:    Lab Results   Component Value Date    TSH 2.43 11/01/2023               Patient Active Problem List   Diagnosis    Encounter for health maintenance examination    Vitamin D deficiency    Vitamin B12 deficiency    Chronic constipation       Social History     Tobacco Use    Smoking status: Never     Passive exposure: Never    Smokeless tobacco: Never   Substance Use Topics    Alcohol use: Yes     Comment: moderate    Drug use: Yes     Types: Marijuana     Comment: occasional       Past Medical History:   Diagnosis Date    Change in bowel habit          Current Outpatient Medications:     sodium,potassium,mag sulfates (Suprep Bowel Prep Kit) 17.5-3.13-1.6  "gram recon soln solution, Take 1 bottle by mouth every 12 hours., Disp: 2 each, Rfl: 0    tadalafil (Cialis) 5 mg tablet, Take 1 tablet (5 mg) by mouth once daily., Disp: 30 tablet, Rfl: 11    Patient has no known allergies.    Family History   Problem Relation Name Age of Onset    Hypertension Mother      Hypertension Father      Heart failure Paternal Grandfather         Past Surgical History:   Procedure Laterality Date    TONSILLECTOMY      WISDOM TOOTH EXTRACTION            Review of systems  Constitutional: No weight loss, fever, chills, weakness or fatigue  HEENT: No visual loss, blurred vision, double vision or yellow sclerae  Skin: No rash or itching  Cardiovascular: No chest pain, pressure or discomfort, No palpitations or edema.  Respiratory: Positive for shortness of breath with palpitations, cough or sputum  Gastrointestinal: No nausea, vomiting or diarrhea. No bloody or dark tarry stools.  Neurological: No headache,  syncope. Positive for lightheadedness with palpitations.  Musculoskeletal: No muscle, back pain, joint pain or stiffness.  Hematologic: No anemia, bleeding or bruising.    /60 (BP Location: Left arm, Patient Position: Sitting, BP Cuff Size: Adult)   Pulse 71   Ht 1.905 m (6' 3\")   Wt 80.6 kg (177 lb 12.8 oz)   BMI 22.22 kg/m²     Physical Exam  Constitutional: Well developed, awake/alert x 3, no distress.  Head/Neck: No JVD, No bruits  Respiratory/Thorax: patent airways, CTAB, normal breath sounds with good expansion.  Cardiovascular: Regular rate and rhythm, no murmurs, normal S1 and S2,   Gastrointestinal: Non distended, soft, non-tender, no rebound tenderness or guarding.  Extremities: No cyanosis, edema.   Neurological: Alert and oriented x 3. Moves extremities spontaneous with purpose.  Psychological: Appropriate mood and behavior  Skin: Warm and Dry. No lesions or rashes.     Assessment/Plan  Palpitations:  Obtain 48 hr monitor to assess for atrial/ventricular arrhythmias. "   Abnormal EKG:    Shortness of breath/lightheadedness:  obtain Echocardiogram to assess for valve and pump function.   Obtain labs, CBC, CMP, Mag, TSH, free T4.   Follow in office after testing for results and recommendations.       Please excuse any errors in grammar or translation related to dictation, voice recognition software was used to prepare this document.

## 2025-02-04 LAB
ALBUMIN SERPL-MCNC: 5.3 G/DL (ref 3.6–5.1)
ALP SERPL-CCNC: 47 U/L (ref 36–130)
ALT SERPL-CCNC: 13 U/L (ref 9–46)
ANION GAP SERPL CALCULATED.4IONS-SCNC: 11 MMOL/L (CALC) (ref 7–17)
AST SERPL-CCNC: 17 U/L (ref 10–40)
BILIRUB SERPL-MCNC: 0.8 MG/DL (ref 0.2–1.2)
BUN SERPL-MCNC: 17 MG/DL (ref 7–25)
CALCIUM SERPL-MCNC: 9.9 MG/DL (ref 8.6–10.3)
CHLORIDE SERPL-SCNC: 102 MMOL/L (ref 98–110)
CO2 SERPL-SCNC: 26 MMOL/L (ref 20–32)
CREAT SERPL-MCNC: 0.94 MG/DL (ref 0.6–1.24)
EGFRCR SERPLBLD CKD-EPI 2021: 113 ML/MIN/1.73M2
ERYTHROCYTE [DISTWIDTH] IN BLOOD BY AUTOMATED COUNT: 13 % (ref 11–15)
GLUCOSE SERPL-MCNC: 90 MG/DL (ref 65–139)
HCT VFR BLD AUTO: 44.5 % (ref 38.5–50)
HGB BLD-MCNC: 15.4 G/DL (ref 13.2–17.1)
MAGNESIUM SERPL-MCNC: 2 MG/DL (ref 1.5–2.5)
MCH RBC QN AUTO: 31.1 PG (ref 27–33)
MCHC RBC AUTO-ENTMCNC: 34.6 G/DL (ref 32–36)
MCV RBC AUTO: 89.9 FL (ref 80–100)
PLATELET # BLD AUTO: 285 THOUSAND/UL (ref 140–400)
PMV BLD REES-ECKER: 9.9 FL (ref 7.5–12.5)
POTASSIUM SERPL-SCNC: 3.8 MMOL/L (ref 3.5–5.3)
PROT SERPL-MCNC: 7.8 G/DL (ref 6.1–8.1)
RBC # BLD AUTO: 4.95 MILLION/UL (ref 4.2–5.8)
SODIUM SERPL-SCNC: 139 MMOL/L (ref 135–146)
T4 FREE SERPL-MCNC: 1.5 NG/DL (ref 0.8–1.8)
TSH SERPL-ACNC: 3.5 MIU/L (ref 0.4–4.5)
WBC # BLD AUTO: 6.2 THOUSAND/UL (ref 3.8–10.8)

## 2025-02-19 ENCOUNTER — ANCILLARY PROCEDURE (OUTPATIENT)
Dept: CARDIOLOGY | Facility: HOSPITAL | Age: 30
End: 2025-02-19
Payer: COMMERCIAL

## 2025-02-19 ENCOUNTER — APPOINTMENT (OUTPATIENT)
Dept: CARDIOLOGY | Facility: CLINIC | Age: 30
End: 2025-02-19
Payer: COMMERCIAL

## 2025-02-19 VITALS
BODY MASS INDEX: 22.13 KG/M2 | HEIGHT: 75 IN | SYSTOLIC BLOOD PRESSURE: 122 MMHG | DIASTOLIC BLOOD PRESSURE: 66 MMHG | WEIGHT: 178 LBS

## 2025-02-19 DIAGNOSIS — R06.02 SHORTNESS OF BREATH: ICD-10-CM

## 2025-02-19 DIAGNOSIS — R00.2 PALPITATIONS: ICD-10-CM

## 2025-02-19 DIAGNOSIS — R42 LIGHTHEADEDNESS: ICD-10-CM

## 2025-02-19 LAB — BODY SURFACE AREA: 2.07 M2

## 2025-02-19 PROCEDURE — 93306 TTE W/DOPPLER COMPLETE: CPT | Performed by: INTERNAL MEDICINE

## 2025-02-19 PROCEDURE — 93306 TTE W/DOPPLER COMPLETE: CPT

## 2025-02-20 ENCOUNTER — DOCUMENTATION (OUTPATIENT)
Dept: CARDIOLOGY | Facility: CLINIC | Age: 30
End: 2025-02-20
Payer: COMMERCIAL

## 2025-02-20 DIAGNOSIS — R93.1 ABNORMAL ECHOCARDIOGRAM: Primary | ICD-10-CM

## 2025-02-20 LAB
AORTIC VALVE MEAN GRADIENT: 7 MMHG
AORTIC VALVE PEAK VELOCITY: 1.64 M/S
AV PEAK GRADIENT: 11 MMHG
AVA (PEAK VEL): 2.76 CM2
AVA (VTI): 2.7 CM2
EJECTION FRACTION APICAL 4 CHAMBER: 56.3
EJECTION FRACTION: 50 %
LEFT VENTRICLE INTERNAL DIMENSION DIASTOLE: 6.06 CM (ref 3.5–6)
LEFT VENTRICULAR OUTFLOW TRACT DIAMETER: 2.3 CM
LV EJECTION FRACTION BIPLANE: 57 %
MITRAL VALVE E/A RATIO: 1.09
RIGHT VENTRICLE PEAK SYSTOLIC PRESSURE: 22.9 MMHG

## 2025-02-20 NOTE — PROGRESS NOTES
Called and spoke with Juan J regarding abnormal echocardiogram results along with recommendation of proceeding with Cardiac MRI to better assess LV function.  Juan J verbalizes understanding.  Cardiac MRI orders placed.

## 2025-02-25 LAB — BODY SURFACE AREA: 2.07 M2

## 2025-02-25 PROCEDURE — 93227 XTRNL ECG REC<48 HR R&I: CPT | Performed by: INTERNAL MEDICINE

## 2025-03-06 ENCOUNTER — APPOINTMENT (OUTPATIENT)
Dept: CARDIOLOGY | Facility: CLINIC | Age: 30
End: 2025-03-06
Payer: COMMERCIAL